# Patient Record
Sex: FEMALE | Race: WHITE | Employment: OTHER | URBAN - METROPOLITAN AREA
[De-identification: names, ages, dates, MRNs, and addresses within clinical notes are randomized per-mention and may not be internally consistent; named-entity substitution may affect disease eponyms.]

---

## 2017-02-09 DIAGNOSIS — E03.9 ACQUIRED HYPOTHYROIDISM: ICD-10-CM

## 2017-02-09 LAB — TSH SERPL DL<=0.005 MIU/L-ACNC: 2.06 MU/L (ref 0.4–4)

## 2017-02-09 PROCEDURE — 36415 COLL VENOUS BLD VENIPUNCTURE: CPT | Performed by: OBSTETRICS & GYNECOLOGY

## 2017-02-09 PROCEDURE — 84443 ASSAY THYROID STIM HORMONE: CPT | Performed by: OBSTETRICS & GYNECOLOGY

## 2017-02-09 NOTE — PROGRESS NOTES
Quick Note:    Please inform of normal results --normal TSH with adjustment in medications  ______

## 2017-02-14 ENCOUNTER — TELEPHONE (OUTPATIENT)
Dept: OBGYN | Facility: CLINIC | Age: 67
End: 2017-02-14

## 2017-02-14 NOTE — TELEPHONE ENCOUNTER
rec'd biometric screen form by mail from patient. Patient last seen in December and no labs were done at that time. Spoke with patient and I offered to place order for her to come fasting and she declines at this time. Patient requesting form to be returned to her by mail. Form mailed back.

## 2017-03-20 ENCOUNTER — TRANSFERRED RECORDS (OUTPATIENT)
Dept: HEALTH INFORMATION MANAGEMENT | Facility: CLINIC | Age: 67
End: 2017-03-20

## 2017-06-17 ENCOUNTER — HEALTH MAINTENANCE LETTER (OUTPATIENT)
Age: 67
End: 2017-06-17

## 2017-08-21 ENCOUNTER — TRANSFERRED RECORDS (OUTPATIENT)
Dept: HEALTH INFORMATION MANAGEMENT | Facility: CLINIC | Age: 67
End: 2017-08-21

## 2017-12-19 DIAGNOSIS — E03.9 ACQUIRED HYPOTHYROIDISM: ICD-10-CM

## 2017-12-21 RX ORDER — LEVOTHYROXINE SODIUM 50 UG/1
TABLET ORAL
Qty: 90 TABLET | Refills: 0 | Status: SHIPPED | OUTPATIENT
Start: 2017-12-21 | End: 2017-12-28

## 2017-12-21 NOTE — TELEPHONE ENCOUNTER
LEVOTHYROXINE 50MCG      Last Written Prescription Date:  6/22/17  Last Fill Quantity: 90,   # refills: 0  Last Office Visit: 12/20/16  Future Office visit:    Next 5 appointments (look out 90 days)     Dec 28, 2017  9:30 AM CST   PHYSICAL with Pradip Domingo MD   [facility] ([facility])    [address] [place] [id number]   [phone]                   Medication is being filled for 1 time refill only due to:  Patient needs to be seen because it has been more than one year since last visit.   Pt due for annual, appt scheduled,3 month supply sent for insurance purposes.

## 2017-12-28 ENCOUNTER — RADIANT APPOINTMENT (OUTPATIENT)
Dept: MAMMOGRAPHY | Facility: CLINIC | Age: 67
End: 2017-12-28
Payer: COMMERCIAL

## 2017-12-28 ENCOUNTER — OFFICE VISIT (OUTPATIENT)
Dept: OBGYN | Facility: CLINIC | Age: 67
End: 2017-12-28
Payer: COMMERCIAL

## 2017-12-28 VITALS
WEIGHT: 106 LBS | SYSTOLIC BLOOD PRESSURE: 142 MMHG | DIASTOLIC BLOOD PRESSURE: 82 MMHG | HEIGHT: 64 IN | BODY MASS INDEX: 18.1 KG/M2

## 2017-12-28 DIAGNOSIS — Z13.6 ENCOUNTER FOR LIPID SCREENING FOR CARDIOVASCULAR DISEASE: ICD-10-CM

## 2017-12-28 DIAGNOSIS — Z13.220 ENCOUNTER FOR LIPID SCREENING FOR CARDIOVASCULAR DISEASE: ICD-10-CM

## 2017-12-28 DIAGNOSIS — M85.80 OSTEOPENIA, SENILE: ICD-10-CM

## 2017-12-28 DIAGNOSIS — Z01.419 ENCOUNTER FOR GYNECOLOGICAL EXAMINATION WITHOUT ABNORMAL FINDING: Primary | ICD-10-CM

## 2017-12-28 DIAGNOSIS — Z12.31 VISIT FOR SCREENING MAMMOGRAM: ICD-10-CM

## 2017-12-28 DIAGNOSIS — Z13.228 SCREENING FOR METABOLIC DISORDER: ICD-10-CM

## 2017-12-28 DIAGNOSIS — Z23 NEED FOR DIPHTHERIA-TETANUS-PERTUSSIS (TDAP) VACCINE: ICD-10-CM

## 2017-12-28 DIAGNOSIS — Z23 NEED FOR PNEUMOCOCCAL VACCINE: ICD-10-CM

## 2017-12-28 DIAGNOSIS — E03.9 ACQUIRED HYPOTHYROIDISM: ICD-10-CM

## 2017-12-28 LAB
ALBUMIN SERPL-MCNC: 4 G/DL (ref 3.4–5)
ALP SERPL-CCNC: 89 U/L (ref 40–150)
ALT SERPL W P-5'-P-CCNC: 19 U/L (ref 0–50)
ANION GAP SERPL CALCULATED.3IONS-SCNC: 5 MMOL/L (ref 3–14)
AST SERPL W P-5'-P-CCNC: 26 U/L (ref 0–45)
BILIRUB SERPL-MCNC: 0.4 MG/DL (ref 0.2–1.3)
BUN SERPL-MCNC: 14 MG/DL (ref 7–30)
CALCIUM SERPL-MCNC: 8.9 MG/DL (ref 8.5–10.1)
CHLORIDE SERPL-SCNC: 104 MMOL/L (ref 94–109)
CHOLEST SERPL-MCNC: 236 MG/DL
CO2 SERPL-SCNC: 30 MMOL/L (ref 20–32)
CREAT SERPL-MCNC: 0.91 MG/DL (ref 0.52–1.04)
GFR SERPL CREATININE-BSD FRML MDRD: 62 ML/MIN/1.7M2
GLUCOSE SERPL-MCNC: 87 MG/DL (ref 70–99)
HDLC SERPL-MCNC: 98 MG/DL
LDLC SERPL CALC-MCNC: 122 MG/DL
NONHDLC SERPL-MCNC: 138 MG/DL
POTASSIUM SERPL-SCNC: 4.8 MMOL/L (ref 3.4–5.3)
PROT SERPL-MCNC: 8 G/DL (ref 6.8–8.8)
SODIUM SERPL-SCNC: 139 MMOL/L (ref 133–144)
T4 FREE SERPL-MCNC: 1.27 NG/DL (ref 0.76–1.46)
TRIGL SERPL-MCNC: 82 MG/DL
TSH SERPL DL<=0.005 MIU/L-ACNC: 3.13 MU/L (ref 0.4–4)

## 2017-12-28 PROCEDURE — 80061 LIPID PANEL: CPT | Performed by: OBSTETRICS & GYNECOLOGY

## 2017-12-28 PROCEDURE — 90472 IMMUNIZATION ADMIN EACH ADD: CPT | Performed by: OBSTETRICS & GYNECOLOGY

## 2017-12-28 PROCEDURE — G0202 SCR MAMMO BI INCL CAD: HCPCS | Mod: TC

## 2017-12-28 PROCEDURE — 84443 ASSAY THYROID STIM HORMONE: CPT | Performed by: OBSTETRICS & GYNECOLOGY

## 2017-12-28 PROCEDURE — 80053 COMPREHEN METABOLIC PANEL: CPT | Performed by: OBSTETRICS & GYNECOLOGY

## 2017-12-28 PROCEDURE — 90670 PCV13 VACCINE IM: CPT | Performed by: OBSTETRICS & GYNECOLOGY

## 2017-12-28 PROCEDURE — 84439 ASSAY OF FREE THYROXINE: CPT | Performed by: OBSTETRICS & GYNECOLOGY

## 2017-12-28 PROCEDURE — 90715 TDAP VACCINE 7 YRS/> IM: CPT | Performed by: OBSTETRICS & GYNECOLOGY

## 2017-12-28 PROCEDURE — 87624 HPV HI-RISK TYP POOLED RSLT: CPT | Performed by: OBSTETRICS & GYNECOLOGY

## 2017-12-28 PROCEDURE — 90471 IMMUNIZATION ADMIN: CPT | Performed by: OBSTETRICS & GYNECOLOGY

## 2017-12-28 PROCEDURE — 36415 COLL VENOUS BLD VENIPUNCTURE: CPT | Performed by: OBSTETRICS & GYNECOLOGY

## 2017-12-28 PROCEDURE — 99397 PER PM REEVAL EST PAT 65+ YR: CPT | Mod: 25 | Performed by: OBSTETRICS & GYNECOLOGY

## 2017-12-28 PROCEDURE — G0145 SCR C/V CYTO,THINLAYER,RESCR: HCPCS | Performed by: OBSTETRICS & GYNECOLOGY

## 2017-12-28 RX ORDER — LEVOTHYROXINE SODIUM 50 UG/1
TABLET ORAL
Qty: 90 TABLET | Refills: 1 | Status: SHIPPED | OUTPATIENT
Start: 2017-12-28 | End: 2019-01-04

## 2017-12-28 RX ORDER — LEVOTHYROXINE SODIUM 75 UG/1
75 TABLET ORAL EVERY OTHER DAY
Qty: 90 TABLET | Refills: 1 | Status: SHIPPED | OUTPATIENT
Start: 2017-12-28 | End: 2019-01-04

## 2017-12-28 ASSESSMENT — ANXIETY QUESTIONNAIRES
6. BECOMING EASILY ANNOYED OR IRRITABLE: NOT AT ALL
2. NOT BEING ABLE TO STOP OR CONTROL WORRYING: NOT AT ALL
IF YOU CHECKED OFF ANY PROBLEMS ON THIS QUESTIONNAIRE, HOW DIFFICULT HAVE THESE PROBLEMS MADE IT FOR YOU TO DO YOUR WORK, TAKE CARE OF THINGS AT HOME, OR GET ALONG WITH OTHER PEOPLE: NOT DIFFICULT AT ALL
1. FEELING NERVOUS, ANXIOUS, OR ON EDGE: NOT AT ALL
5. BEING SO RESTLESS THAT IT IS HARD TO SIT STILL: NOT AT ALL
7. FEELING AFRAID AS IF SOMETHING AWFUL MIGHT HAPPEN: NOT AT ALL
3. WORRYING TOO MUCH ABOUT DIFFERENT THINGS: NOT AT ALL
GAD7 TOTAL SCORE: 0

## 2017-12-28 ASSESSMENT — PATIENT HEALTH QUESTIONNAIRE - PHQ9
5. POOR APPETITE OR OVEREATING: NOT AT ALL
SUM OF ALL RESPONSES TO PHQ QUESTIONS 1-9: 0

## 2017-12-28 NOTE — MR AVS SNAPSHOT
"              After Visit Summary   12/28/2017    Rebecca Wang    MRN: [id number]           Patient Information     Date Of Birth          1950        Visit Information        Provider Department      12/28/2017 9:30 AM Pradip Domingo MD [facility]        Today's Diagnoses     Encounter for gynecological examination without abnormal finding    -  1    Acquired hypothyroidism        Encounter for lipid screening for cardiovascular disease        Screening for metabolic disorder        Need for pneumococcal vaccine        Need for diphtheria-tetanus-pertussis (Tdap) vaccine        Osteopenia, senile           Follow-ups after your visit        Who to contact     If you have questions or need follow up information about today's clinic visit or your schedule please contact [facility] directly at [phone].  Normal or non-critical lab and imaging results will be communicated to you by [employer], letter or phone within 4 business days after the clinic has received the results. If you do not hear from us within 7 days, please contact the clinic through [employer] or phone. If you have a critical or abnormal lab result, we will notify you by phone as soon as possible.  Submit refill requests through [employer] or call your pharmacy and they will forward the refill request to us. Please allow 3 business days for your refill to be completed.          Additional Information About Your Visit        [employer] Information     [employer] lets you send messages to your doctor, view your test results, renew your prescriptions, schedule appointments and more. To sign up, go to [email or web address] . Click on \"Log in\" on the left side of the screen, which will take you to the Welcome page. Then click on \"Sign up Now\" on the right side of the page.     You will be asked to enter the access code listed below, as well as some personal information. Please follow the directions to create " "your username and password.     Your access code is: [email or web address]  Expires: 3/28/2018 10:16 AM     Your access code will  in 90 days. If you need help or a new code, please call your [facility] clinic or [phone].        Care EveryWhere ID     This is your Care EveryWhere ID. This could be used by other organizations to access your [facility] medical records  [id number]        Your Vitals Were     Height Breastfeeding? BMI (Body Mass Index)             5' 3.5\" (1.613 m) No 18.48 kg/m2          Blood Pressure from Last 3 Encounters:   17 142/82   16 128/72   16 120/78    Weight from Last 3 Encounters:   17 106 lb (48.1 kg)   16 109 lb (49.4 kg)   16 109 lb (49.4 kg)              We Performed the Following     Comprehensive metabolic panel     EA ADD'L VACCINE     HPV High Risk Types DNA Cervical     Lipid panel reflex to direct LDL Fasting     Pap imaged thin layer screen with HPV - recommended age 30 - 65     Pneumococcal vaccine 13 valent PCV13 IM (Prevnar) [[id number]]     T4 free     TDAP VACCINE (ADACEL)     TSH     VACCINE ADMINISTRATION, INITIAL          Today's Medication Changes          These changes are accurate as of: 17 10:16 AM.  If you have any questions, ask your nurse or doctor.               These medicines have changed or have updated prescriptions.        Dose/Directions    * levothyroxine 50 MCG tablet   Commonly known as:  SYNTHROID/LEVOTHROID   This may have changed:  See the new instructions.   Used for:  Acquired hypothyroidism   Changed by:  Pradip Domingo MD        TAKE 1 TABLET BY MOUTH EVERY OTHER DAY   Quantity:  90 tablet   Refills:  1       * levothyroxine 75 MCG tablet   Commonly known as:  SYNTHROID/LEVOTHROID   This may have changed:  Another medication with the same name was changed. Make sure you understand how and when to take each.   Used for:  Acquired hypothyroidism   Changed by:  Pradip Domingo MD        Dose:  75 mcg "   Take 1 tablet (75 mcg) by mouth every other day Alternating with the 50mcg tabs.   Quantity:  90 tablet   Refills:  1       * Notice:  This list has 2 medication(s) that are the same as other medications prescribed for you. Read the directions carefully, and ask your doctor or other care provider to review them with you.         Where to get your medicines      These medications were sent to [facility] [place] - [address] AT [facility] [address]  [address], [place] [address]     Phone:  [phone]     levothyroxine 50 MCG tablet    levothyroxine 75 MCG tablet                Primary Care Provider Office Phone # Fax #    Pradip Domingo [phone] [address] [address]        Equal Access to Services     FRAN GALLAGHER AH: Hadii giacomo ivy hadasho Soomaali, waaxda luqadaha, qaybta kaalmada adeegyada, matty luz . So [facility] [phone].    ATENCIÓN: Si habla español, tiene a galvan disposición servicios gratuitos de asistencia lingüística. Horace al [phone].    We comply with applicable federal civil rights laws and [place] laws. We do not discriminate on the basis of race, color, national origin, age, disability, sex, sexual orientation, or gender identity.            Thank you!     Thank you for choosing [facility]  for your care. Our goal is always to provide you with excellent care. Hearing back from our patients is one way we can continue to improve our services. Please take a few minutes to complete the written survey that you may receive in the mail after your visit with us. Thank you!             Your Updated Medication List - Protect others around you: Learn how to safely use, store and throw away your medicines at www.disposemymeds.org.          This list is accurate as of: 12/28/17 10:16 AM.  Always use your most recent med list.                   Brand Name Dispense  Instructions for use Diagnosis    BIOTIN PO           CALCIUM PO      Take 600 mg by mouth        ibuprofen 600 MG tablet    ADVIL/MOTRIN    30 tablet    Take 1 tablet (600 mg) by mouth every 8 hours as needed for moderate pain        * levothyroxine 50 MCG tablet    SYNTHROID/LEVOTHROID    90 tablet    TAKE 1 TABLET BY MOUTH EVERY OTHER DAY    Acquired hypothyroidism       * levothyroxine 75 MCG tablet    SYNTHROID/LEVOTHROID    90 tablet    Take 1 tablet (75 mcg) by mouth every other day Alternating with the 50mcg tabs.    Acquired hypothyroidism       vitamin B complex with vitamin C Tabs tablet      Take 1 tablet by mouth daily        VITAMIN D3 PO      Take by mouth daily        * Notice:  This list has 2 medication(s) that are the same as other medications prescribed for you. Read the directions carefully, and ask your doctor or other care provider to review them with you.

## 2017-12-28 NOTE — PROGRESS NOTES
Rebecca is a 67 year old  female who presents for annual exam.     Besides routine health maintenance,  she would like to discuss multi vitamins and pneumonia vaccination.    Do you have a Health Care Directive?: Yes: Patient states has Advance Directive and will bring in a copy to clinic.    Fall risk:          HPI:  The patient does not have a PCP.    No problems.   Takes Thyroid doses every other day.  Taking vitamins and supplements.      GYNECOLOGIC HISTORY:No LMP recorded. Patient is postmenopausal..   reports that she has never smoked. She has never used smokeless tobacco.      Patient is not sexually active.  STD testing offered?  Declined  Last PHQ-9 score on record=   PHQ-9 SCORE 2017   Total Score 0     Last GAD7 score on record=   QUINTIN-7 SCORE 12/15/2015 2016 2017   Total Score 0 0 0     Alcohol Score = 1    HEALTH MAINTENANCE:  Cholesterol:   Cholesterol   Date Value Ref Range Status   12/15/2015 254 (H) <200 mg/dL Final     Comment:     Desirable:       <200 mg/dl   2013 233 (H) 125 - 200 mg/dL Final    12/15/15   Total= 254, Triglycerides=61, LJP=018, WHL=107, TSH=9.36  Last Mammo: one year ago, Result: normal, Next Mammo: today   Pap: 2012 WNL HPV (-)neg  DEXA:  16  Colonoscopy:  17, Result:  normal, Next Colonoscopy: 5 years.    Health maintenance updated:  yes    HISTORY:  Obstetric History       T3      L6     SAB1   TAB0   Ectopic1   Multiple0   Live Births3       # Outcome Date GA Lbr Sebastien/2nd Weight Sex Delivery Anes PTL Lv   5 Term         MATILDE   4 Term         MATILDE   3 Term         MATILDE   2 SAB            1 Ectopic                 Patient Active Problem List   Diagnosis     Acquired hypothyroidism     Osteopenia, senile     Past Surgical History:   Procedure Laterality Date      SECTION       CYSTOSCOPY, RETROGRADES, COMBINED Right 2016    Procedure: COMBINED CYSTOSCOPY, RETROGRADES;  Surgeon: Zac Nunez MD;   "Location: [facility] OR     CYSTOSCOPY, URETEROSCOPY, COMBINED  8/24/2016    Procedure: COMBINED CYSTOSCOPY, URETEROSCOPY;  Surgeon: Zac Nunez MD;  Location: [facility] OR     ectopic pregnancy removal       TONSILLECTOMY        Social History   Substance Use Topics     Smoking status: Never Smoker     Smokeless tobacco: Never Used     Alcohol use 0.0 oz/week     0 Standard drinks or equivalent per week      Problem (# of Occurrences) Relation (Name,Age of Onset)    Hypertension (1) Mother            Current Outpatient Prescriptions   Medication Sig     levothyroxine (SYNTHROID/LEVOTHROID) 50 MCG tablet TAKE 1 TABLET BY MOUTH EVERY OTHER DAY     levothyroxine (SYNTHROID/LEVOTHROID) 75 MCG tablet Take 1 tablet (75 mcg) by mouth every other day Alternating with the 50mcg tabs.     BIOTIN PO      ibuprofen (ADVIL,MOTRIN) 600 MG tablet Take 1 tablet (600 mg) by mouth every 8 hours as needed for moderate pain     CALCIUM PO Take 600 mg by mouth      vitamin  B complex with vitamin C (VITAMIN  B COMPLEX) TABS Take 1 tablet by mouth daily     Cholecalciferol (VITAMIN D3 PO) Take by mouth daily     [DISCONTINUED] levothyroxine (SYNTHROID/LEVOTHROID) 50 MCG tablet TAKE 1 TABLET BY MOUTH EVERY OTHER DAY     [DISCONTINUED] levothyroxine (SYNTHROID/LEVOTHROID) 50 MCG tablet TAKE 1 TABLET BY MOUTH EVERY OTHER DAY (Patient not taking: Reported on 12/28/2017)     [DISCONTINUED] levothyroxine (SYNTHROID/LEVOTHROID) 75 MCG tablet Take 1 tablet (75 mcg) by mouth every other day Alternating with the 50mcg tabs.     No current facility-administered medications for this visit.        No Known Allergies    Past medical, surgical, social and family history were reviewed and updated in EPIC.    ROS:   Respiratory: Cough    EXAM:  /82  Ht 5' 3.5\" (1.613 m)  Wt 106 lb (48.1 kg)  Breastfeeding? No  BMI 18.48 kg/m2   BMI: Body mass index is 18.48 kg/(m^2).    EXAM:  Constitutional: Appearance: Well nourished, well developed alert, in no acute " distress  Neck:  Lymph Nodes:  No lymphadenopathy present    Thyroid:  Gland size normal, nontender, no nodules or masses present  on palpation  Chest:  Respiratory Effort:  Breathing unlabored  Cardiovascular:Heart    Auscultation:  Regular rate, normal rhythm, no murmurs present  Breasts: Inspection of Breasts:  No lymphadenopathy present., Palpation of Breasts and Axillae:  No masses present on palpation, no breast tenderness., Axillary Lymph Nodes:  No lymphadenopathy present. and No nodularity, asymmetry or nipple discharge bilaterally.  Gastrointestinal:  Abdominal Examination:  Abdomen nontender to palpation, tone normal without     rigidity or guarding, no masses present, umbilicus without lesions    Liver and speen:  No hepatomegaly present, liver nontender to palpation    Hernias:  No hernias present  Lymphatic: Lymph Nodes:  No other lymphadenopathy present  Skin:  General Inspection:  No rashes present, no lesions present, no areas of  discoloration.    Genitalia and Groin:  No rashes present, no lesions present, no areas of  discoloration, no masses present  Neurologic/Psychiatric:    Mental Status:  Oriented X3     Pelvic Exam:  External Genitalia:     Normal appearance for age, no discharge present, no tenderness present, no inflammatory lesions present, color normal  Vagina:     Normal vaginal vault without central or paravaginal defects, ATROPHIC  Bladder:     Nontender to palpation  Urethra:   Urethral Body:  Urethra palpation normal, urethra structural support normal   Urethral Meatus:  No erythema or lesions present  Cervix:     Appearance healthy, no lesions present, nontender to palpation, no bleeding present  Uterus:     Nontender to palpation, no masses present, position anteflexed, mobility: normal  Adnexa:     No adnexal tenderness present, no adnexal masses present  Perineum:     Perineum within normal limits, no evidence of trauma, no rashes or skin lesions present  Inguinal Lymph Nodes:      No lymphadenopathy present        COUNSELING:   Reviewed preventive health counseling, as reflected in patient instructions       Regular exercise    BMI:  Body mass index is 18.48 kg/(m^2).  Weight management plan noted, stable and monitoring   reports that she has never smoked. She has never used smokeless tobacco.        ASSESSMENT:  67 year old female with satisfactory annual exam.    ICD-10-CM    1. Encounter for gynecological examination without abnormal finding Z01.419 Pap imaged thin layer screen with HPV - recommended age 30 - 65     HPV High Risk Types DNA Cervical   2. Acquired hypothyroidism E03.9 levothyroxine (SYNTHROID/LEVOTHROID) 50 MCG tablet     levothyroxine (SYNTHROID/LEVOTHROID) 75 MCG tablet     TSH     T4 free   3. Encounter for lipid screening for cardiovascular disease Z13.220 Lipid panel reflex to direct LDL Fasting    Z13.6    4. Screening for metabolic disorder Z13.228 Comprehensive metabolic panel   5. Need for pneumococcal vaccine Z23 Pneumococcal vaccine 13 valent PCV13 IM (Prevnar) [[id number]]     EA ADD'L VACCINE   6. Need for diphtheria-tetanus-pertussis (Tdap) vaccine Z23 TDAP VACCINE (ADACEL)     VACCINE ADMINISTRATION, INITIAL   7. Osteopenia, senile M85.80        PLAN:  DEXA next year for osteopenia.  Vaccinations today.  Shingrx next year.  Reviewed vitamins and correct dosing.    Pradip Domingo MD

## 2017-12-28 NOTE — LETTER
January 8, 2018      Rebecca JUÁREZ Felipe  [address] [facility]  [place] [address]    Dear ,      I am happy to inform you that your cervical cancer screening test (PAP smear) was normal and your Human Papillomavirus (HPV) test was negative.    Per current guidelines, you no longer need to have pap smears completed.     Please continue to be seen every year for an annual wellness visit and other preventative tests.     Please contact my office at [phone] if you have further questions.    Sincerely,      Pradip Domingo MD/rafat

## 2017-12-29 ASSESSMENT — ANXIETY QUESTIONNAIRES: GAD7 TOTAL SCORE: 0

## 2018-01-02 LAB
COPATH REPORT: NORMAL
PAP: NORMAL

## 2018-01-04 LAB
FINAL DIAGNOSIS: NORMAL
HPV HR 12 DNA CVX QL NAA+PROBE: NEGATIVE
HPV16 DNA SPEC QL NAA+PROBE: NEGATIVE
HPV18 DNA SPEC QL NAA+PROBE: NEGATIVE
SPECIMEN DESCRIPTION: NORMAL

## 2018-01-08 PROBLEM — Z12.4 CERVICAL CANCER SCREENING: Status: RESOLVED | Noted: 2018-01-08 | Resolved: 2018-01-08

## 2018-03-15 ENCOUNTER — TRANSFERRED RECORDS (OUTPATIENT)
Dept: HEALTH INFORMATION MANAGEMENT | Facility: CLINIC | Age: 68
End: 2018-03-15

## 2018-04-02 ENCOUNTER — TRANSFERRED RECORDS (OUTPATIENT)
Dept: HEALTH INFORMATION MANAGEMENT | Facility: CLINIC | Age: 68
End: 2018-04-02

## 2018-10-03 ENCOUNTER — TRANSFERRED RECORDS (OUTPATIENT)
Dept: HEALTH INFORMATION MANAGEMENT | Facility: CLINIC | Age: 68
End: 2018-10-03

## 2019-01-02 NOTE — PROGRESS NOTES
Rebecca is a 68 year old  female who presents for annual exam.     Besides routine health maintenance, she has no other health concerns today .    Do you have a Health Care Directive?: Yes: Advance Directive has been received and scanned.    Fall risk:   Fallen 2 or more times in the past year?: No  Any fall with injury in the past year?: No    HPI:  The patient's PCP is  Pradip Domingo MD.    Doing well. No issues. DEXA done today. Still has severe osteopenia of hips.     GYNECOLOGIC HISTORY:  No LMP recorded. Patient is postmenopausal..   reports that  has never smoked. she has never used smokeless tobacco.    Patient is sexually active.  STD testing offered?  Declined  Last PHQ-9 score on record=   PHQ-9 SCORE 1/3/2019   PHQ-9 Total Score 0     Last GAD7 score on record=   QUINTIN-7 SCORE 2016 2017 1/3/2019   Total Score 0 0 0     Alcohol Score = 1    HEALTH MAINTENANCE:  Cholesterol: 17   Total= 236, Triglycerides=82, HDL=98, ZJM=700, FBS=87, TSH=3.13  Cholesterol   Date Value Ref Range Status   2017 236 (H) <200 mg/dL Final     Comment:     Desirable:       <200 mg/dl   12/15/2015 254 (H) <200 mg/dL Final     Comment:     Desirable:       <200 mg/dl      Last Mammo: one year ago, Result: normal, Next Mammo: today   Pap:   Lab Results   Component Value Date    PAP NIL 2017      DEXA:  today  Colonoscopy:  17, Result:  diverticulosis, Next Colonoscopy: 4 years.    Health maintenance updated:  yes    HISTORY:  Obstetric History       T3      L6     SAB1   TAB0   Ectopic1   Multiple0   Live Births3       # Outcome Date GA Lbr Sebastien/2nd Weight Sex Delivery Anes PTL Lv   5 Term         MATILDE   4 Term         MATILDE   3 Term         MATILDE   2 SAB            1 Ectopic                 Patient Active Problem List   Diagnosis     Acquired hypothyroidism     Osteopenia, senile     Past Surgical History:   Procedure Laterality Date      SECTION       CYSTOSCOPY,  "RETROGRADES, COMBINED Right 8/24/2016    Procedure: COMBINED CYSTOSCOPY, RETROGRADES;  Surgeon: Zac Nunez MD;  Location: [facility] OR     CYSTOSCOPY, URETEROSCOPY, COMBINED  8/24/2016    Procedure: COMBINED CYSTOSCOPY, URETEROSCOPY;  Surgeon: Zac Nunez MD;  Location: [facility] OR     ectopic pregnancy removal       TONSILLECTOMY        Social History     Tobacco Use     Smoking status: Never Smoker     Smokeless tobacco: Never Used   Substance Use Topics     Alcohol use: Yes     Alcohol/week: 0.0 oz      Problem (# of Occurrences) Relation (Name,Age of Onset)    Hypertension (1) Mother            Current Outpatient Medications   Medication Sig     BIOTIN PO      CALCIUM PO Take 600 mg by mouth      Cholecalciferol (VITAMIN D3 PO) Take by mouth daily     ibuprofen (ADVIL,MOTRIN) 600 MG tablet Take 1 tablet (600 mg) by mouth every 8 hours as needed for moderate pain     levothyroxine (SYNTHROID/LEVOTHROID) 50 MCG tablet TAKE 1 TABLET BY MOUTH EVERY OTHER DAY     levothyroxine (SYNTHROID/LEVOTHROID) 75 MCG tablet Take 1 tablet (75 mcg) by mouth every other day Alternating with the 50mcg tabs.     vitamin  B complex with vitamin C (VITAMIN  B COMPLEX) TABS Take 1 tablet by mouth daily     No current facility-administered medications for this visit.        No Known Allergies    Past medical, surgical, social and family history were reviewed and updated in EPIC.    ROS:   12 point review of systems negative other than symptoms noted below.  Respiratory: Cough  Endocrine: Loss of Hair    EXAM:  /66   Pulse 76   Ht 1.613 m (5' 3.5\")   Wt 47.2 kg (104 lb)   BMI 18.13 kg/m     BMI: Body mass index is 18.13 kg/m .    EXAM:  Constitutional: Appearance: Well nourished, well developed alert, in no acute distress  Neck:  Lymph Nodes:  No lymphadenopathy present    Thyroid:  Gland size normal, nontender, no nodules or masses present  on palpation  Chest:  Respiratory Effort:  Breathing " unlabored  Cardiovascular:Heart    Auscultation:  Regular rate, normal rhythm, no murmurs present  Breasts: Inspection of Breasts:  No lymphadenopathy present., Palpation of Breasts and Axillae:  No masses present on palpation, no breast tenderness., Axillary Lymph Nodes:  No lymphadenopathy present. and No nodularity, asymmetry or nipple discharge bilaterally.  Gastrointestinal:  Abdominal Examination:  Abdomen nontender to palpation, tone normal without     rigidity or guarding, no masses present, umbilicus without lesions    Liver and speen:  No hepatomegaly present, liver nontender to palpation    Hernias:  No hernias present  Lymphatic: Lymph Nodes:  No other lymphadenopathy present  Skin:  General Inspection:  No rashes present, no lesions present, no areas of  discoloration.    Genitalia and Groin:  No rashes present, no lesions present, no areas of  discoloration, no masses present  Neurologic/Psychiatric:    Mental Status:  Oriented X3     Pelvic Exam:  External Genitalia:     Normal appearance for age, no discharge present, no tenderness present, no inflammatory lesions present, color normal  Vagina:     Normal vaginal vault without central or paravaginal defects, ATROPHIC  Bladder:     Nontender to palpation  Urethra:   Urethral Body:  Urethra palpation normal, urethra structural support normal   Urethral Meatus:  No erythema or lesions present  Cervix:     Appearance healthy, no lesions present, nontender to palpation, no bleeding present  Uterus:     Nontender to palpation, no masses present, position anteflexed, mobility: normal  Adnexa:     No adnexal tenderness present, no adnexal masses present  Perineum:     Perineum within normal limits, no evidence of trauma, no rashes or skin lesions present  Inguinal Lymph Nodes:     No lymphadenopathy present      COUNSELING:   Reviewed preventive health counseling, as reflected in patient instructions       Regular exercise       Osteoporosis Prevention/Bone  Health    BMI:  Body mass index is 18.13 kg/m .     reports that  has never smoked. she has never used smokeless tobacco.      ASSESSMENT:  68 year old female with satisfactory annual exam.    ICD-10-CM    1. Encounter for gynecological examination without abnormal finding Z01.419    2. Acquired hypothyroidism E03.9 TSH     T4 free   3. Encounter for hepatitis C screening test for low risk patient Z11.59 Hepatitis C antibody   4. Need for pneumococcal vaccine Z23 PPSV23, IM/SUBQ (2+ YRS) - [id number]     INITIAL VACCINE ADMINSTRATION   5. Osteopenia, senile M85.80        PLAN:  Discussed DEXA. Pt taking calcium and vit d. Staying active.  No FH of fracture.   FRAX calculations are still below treatment threshold. Reviewed data with patient. Explained use of neck density instead of total hip. Also discussed FRAX usage despite some decline in spine.   Will repeat in 2 years. If any further decline will need meds.    Pradip Domingo MD

## 2019-01-03 ENCOUNTER — RADIANT APPOINTMENT (OUTPATIENT)
Dept: MAMMOGRAPHY | Facility: CLINIC | Age: 69
End: 2019-01-03
Payer: COMMERCIAL

## 2019-01-03 ENCOUNTER — ANCILLARY PROCEDURE (OUTPATIENT)
Dept: BONE DENSITY | Facility: CLINIC | Age: 69
End: 2019-01-03
Payer: COMMERCIAL

## 2019-01-03 ENCOUNTER — OFFICE VISIT (OUTPATIENT)
Dept: OBGYN | Facility: CLINIC | Age: 69
End: 2019-01-03
Payer: COMMERCIAL

## 2019-01-03 VITALS
BODY MASS INDEX: 17.75 KG/M2 | HEIGHT: 64 IN | HEART RATE: 76 BPM | DIASTOLIC BLOOD PRESSURE: 66 MMHG | SYSTOLIC BLOOD PRESSURE: 122 MMHG | WEIGHT: 104 LBS

## 2019-01-03 DIAGNOSIS — M85.80 OSTEOPENIA, SENILE: ICD-10-CM

## 2019-01-03 DIAGNOSIS — Z01.419 ENCOUNTER FOR GYNECOLOGICAL EXAMINATION WITHOUT ABNORMAL FINDING: Primary | ICD-10-CM

## 2019-01-03 DIAGNOSIS — Z23 NEED FOR PNEUMOCOCCAL VACCINE: ICD-10-CM

## 2019-01-03 DIAGNOSIS — Z12.31 VISIT FOR SCREENING MAMMOGRAM: ICD-10-CM

## 2019-01-03 DIAGNOSIS — Z11.59 ENCOUNTER FOR HEPATITIS C SCREENING TEST FOR LOW RISK PATIENT: ICD-10-CM

## 2019-01-03 DIAGNOSIS — E03.9 ACQUIRED HYPOTHYROIDISM: ICD-10-CM

## 2019-01-03 DIAGNOSIS — Z78.0 ASYMPTOMATIC POSTMENOPAUSAL STATE: ICD-10-CM

## 2019-01-03 PROCEDURE — 84439 ASSAY OF FREE THYROXINE: CPT | Performed by: OBSTETRICS & GYNECOLOGY

## 2019-01-03 PROCEDURE — 99397 PER PM REEVAL EST PAT 65+ YR: CPT | Mod: 25 | Performed by: OBSTETRICS & GYNECOLOGY

## 2019-01-03 PROCEDURE — 99213 OFFICE O/P EST LOW 20 MIN: CPT | Mod: 25 | Performed by: OBSTETRICS & GYNECOLOGY

## 2019-01-03 PROCEDURE — 77067 SCR MAMMO BI INCL CAD: CPT | Mod: TC

## 2019-01-03 PROCEDURE — 86803 HEPATITIS C AB TEST: CPT | Performed by: OBSTETRICS & GYNECOLOGY

## 2019-01-03 PROCEDURE — 84443 ASSAY THYROID STIM HORMONE: CPT | Performed by: OBSTETRICS & GYNECOLOGY

## 2019-01-03 PROCEDURE — 36415 COLL VENOUS BLD VENIPUNCTURE: CPT | Performed by: OBSTETRICS & GYNECOLOGY

## 2019-01-03 PROCEDURE — 77080 DXA BONE DENSITY AXIAL: CPT | Performed by: OBSTETRICS & GYNECOLOGY

## 2019-01-03 PROCEDURE — G0009 ADMIN PNEUMOCOCCAL VACCINE: HCPCS | Performed by: OBSTETRICS & GYNECOLOGY

## 2019-01-03 PROCEDURE — 90732 PPSV23 VACC 2 YRS+ SUBQ/IM: CPT | Performed by: OBSTETRICS & GYNECOLOGY

## 2019-01-03 ASSESSMENT — ANXIETY QUESTIONNAIRES
1. FEELING NERVOUS, ANXIOUS, OR ON EDGE: NOT AT ALL
2. NOT BEING ABLE TO STOP OR CONTROL WORRYING: NOT AT ALL
5. BEING SO RESTLESS THAT IT IS HARD TO SIT STILL: NOT AT ALL
7. FEELING AFRAID AS IF SOMETHING AWFUL MIGHT HAPPEN: NOT AT ALL
6. BECOMING EASILY ANNOYED OR IRRITABLE: NOT AT ALL
3. WORRYING TOO MUCH ABOUT DIFFERENT THINGS: NOT AT ALL
GAD7 TOTAL SCORE: 0

## 2019-01-03 ASSESSMENT — PATIENT HEALTH QUESTIONNAIRE - PHQ9
5. POOR APPETITE OR OVEREATING: NOT AT ALL
SUM OF ALL RESPONSES TO PHQ QUESTIONS 1-9: 0

## 2019-01-03 ASSESSMENT — MIFFLIN-ST. JEOR: SCORE: 988.8

## 2019-01-04 ENCOUNTER — TELEPHONE (OUTPATIENT)
Dept: OBGYN | Facility: CLINIC | Age: 69
End: 2019-01-04

## 2019-01-04 LAB
HCV AB SERPL QL IA: NONREACTIVE
T4 FREE SERPL-MCNC: 1.14 NG/DL (ref 0.76–1.46)
TSH SERPL DL<=0.005 MIU/L-ACNC: 6.21 MU/L (ref 0.4–4)

## 2019-01-04 RX ORDER — LEVOTHYROXINE SODIUM 50 UG/1
50 TABLET ORAL DAILY
Qty: 40 TABLET | Refills: 0 | Status: SHIPPED | OUTPATIENT
Start: 2019-01-04 | End: 2019-01-04

## 2019-01-04 RX ORDER — LEVOTHYROXINE SODIUM 75 UG/1
75 TABLET ORAL EVERY OTHER DAY
Qty: 40 TABLET | Refills: 0 | Status: SHIPPED | OUTPATIENT
Start: 2019-01-04 | End: 2019-01-07

## 2019-01-04 ASSESSMENT — ANXIETY QUESTIONNAIRES: GAD7 TOTAL SCORE: 0

## 2019-01-04 NOTE — RESULT ENCOUNTER NOTE
TSH high, meaning needs more hormone. Try taking the 75mcg pill daily for 6 weeks and recheck. No alternating pills. Send refill for 6weeks of the 75mcg

## 2019-01-07 ENCOUNTER — TELEPHONE (OUTPATIENT)
Dept: OBGYN | Facility: CLINIC | Age: 69
End: 2019-01-07

## 2019-01-07 DIAGNOSIS — E03.9 ACQUIRED HYPOTHYROIDISM: ICD-10-CM

## 2019-01-07 RX ORDER — LEVOTHYROXINE SODIUM 75 UG/1
75 TABLET ORAL DAILY
Qty: 40 TABLET | Refills: 0 | Status: SHIPPED | OUTPATIENT
Start: 2019-01-07 | End: 2019-03-26

## 2019-01-07 NOTE — TELEPHONE ENCOUNTER
"Pt calling to clarify synthroid dosing. Per OV notes \"TSH high, meaning needs more hormone. Try taking the 75mcg pill daily for 6 weeks and recheck. No alternating pills. Send refill for 6weeks of the 75mcg.\" New rx sent to pharmacy, lab orders placed for future. No further questions/concerns.  Eulalia Martinez RN on 1/7/2019 at 9:42 AM    "

## 2019-02-18 DIAGNOSIS — E03.9 ACQUIRED HYPOTHYROIDISM: ICD-10-CM

## 2019-02-18 PROCEDURE — 84439 ASSAY OF FREE THYROXINE: CPT | Performed by: OBSTETRICS & GYNECOLOGY

## 2019-02-18 PROCEDURE — 84443 ASSAY THYROID STIM HORMONE: CPT | Performed by: OBSTETRICS & GYNECOLOGY

## 2019-02-18 PROCEDURE — 36415 COLL VENOUS BLD VENIPUNCTURE: CPT | Performed by: OBSTETRICS & GYNECOLOGY

## 2019-02-19 LAB
T4 FREE SERPL-MCNC: 1.15 NG/DL (ref 0.76–1.46)
TSH SERPL DL<=0.005 MIU/L-ACNC: 3.67 MU/L (ref 0.4–4)

## 2019-02-19 NOTE — RESULT ENCOUNTER NOTE
Normal levels. Continue 75mcg daily and recheck in 3 months to be sure the dose is good. TSH just in normal range. Make sure there are no manufacture changes when getting refills.

## 2019-02-20 ENCOUNTER — TELEPHONE (OUTPATIENT)
Dept: OBGYN | Facility: CLINIC | Age: 69
End: 2019-02-20

## 2019-02-20 DIAGNOSIS — E03.9 ACQUIRED HYPOTHYROIDISM: Primary | ICD-10-CM

## 2019-02-20 NOTE — TELEPHONE ENCOUNTER
Patient called to schedule lab appt. For 5/20, f/u to thyroid labs.  Need orders placed.        Orders placed  Eulalia aMrtinez RN on 2/20/2019 at 12:37 PM

## 2019-03-26 DIAGNOSIS — E03.9 ACQUIRED HYPOTHYROIDISM: ICD-10-CM

## 2019-03-26 RX ORDER — LEVOTHYROXINE SODIUM 75 UG/1
TABLET ORAL
Qty: 30 TABLET | Refills: 0 | Status: SHIPPED | OUTPATIENT
Start: 2019-03-26 | End: 2019-04-29

## 2019-03-26 NOTE — TELEPHONE ENCOUNTER
"Requested Prescriptions   Pending Prescriptions Disp Refills     levothyroxine (SYNTHROID/LEVOTHROID) 75 MCG tablet [Pharmacy Med Name: LEVOTHYROXINE 0.075MG (75MCG) TABS] 40 tablet 0     Sig: TAKE 1 TABLET(75 MCG) BY MOUTH DAILY    Thyroid Protocol Passed - 3/26/2019 10:27 AM       Passed - Patient is 12 years or older       Passed - Recent (12 mo) or future (30 days) visit within the authorizing provider's specialty    Patient had office visit in the last 12 months or has a visit in the next 30 days with authorizing provider or within the authorizing provider's specialty.  See \"Patient Info\" tab in inbasket, or \"Choose Columns\" in Meds & Orders section of the refill encounter.             Passed - Medication is active on med list       Passed - Normal TSH on file in past 12 months    Recent Labs   Lab Test 02/18/19  1044   TSH 3.67             Passed - No active pregnancy on record    If patient is pregnant or has had a positive pregnancy test, please check TSH.         Passed - No positive pregnancy test in past 12 months    If patient is pregnant or has had a positive pregnancy test, please check TSH.          Medication is being filled for 1 time refill only due to:  Patient needs labs TSH/T4 .   Eulalia Martinez RN on 3/26/2019 at 10:35 AM      "

## 2019-04-08 ENCOUNTER — TRANSFERRED RECORDS (OUTPATIENT)
Dept: HEALTH INFORMATION MANAGEMENT | Facility: CLINIC | Age: 69
End: 2019-04-08

## 2019-04-29 DIAGNOSIS — E03.9 ACQUIRED HYPOTHYROIDISM: ICD-10-CM

## 2019-04-29 RX ORDER — LEVOTHYROXINE SODIUM 75 UG/1
TABLET ORAL
Qty: 30 TABLET | Refills: 0 | Status: SHIPPED | OUTPATIENT
Start: 2019-04-29 | End: 2019-05-22

## 2019-04-29 RX ORDER — LEVOTHYROXINE SODIUM 75 UG/1
TABLET ORAL
Qty: 30 TABLET | Refills: 0 | OUTPATIENT
Start: 2019-04-29

## 2019-04-29 NOTE — TELEPHONE ENCOUNTER
"Requested Prescriptions   Pending Prescriptions Disp Refills     levothyroxine (SYNTHROID/LEVOTHROID) 75 MCG tablet [Pharmacy Med Name: LEVOTHYROXINE 0.075MG (75MCG) TABS] 30 tablet 0     Sig: TAKE 1 TABLET BY MOUTH(75MCG) DAILY       Thyroid Protocol Passed - 4/29/2019  9:08 AM        Passed - Patient is 12 years or older        Passed - Recent (12 mo) or future (30 days) visit within the authorizing provider's specialty     Patient had office visit in the last 12 months or has a visit in the next 30 days with authorizing provider or within the authorizing provider's specialty.  See \"Patient Info\" tab in inbasket, or \"Choose Columns\" in Meds & Orders section of the refill encounter.              Passed - Medication is active on med list        Passed - Normal TSH on file in past 12 months     Recent Labs   Lab Test 02/18/19  1044   TSH 3.67              Passed - No active pregnancy on record     If patient is pregnant or has had a positive pregnancy test, please check TSH.          Passed - No positive pregnancy test in past 12 months     If patient is pregnant or has had a positive pregnancy test, please check TSH.          Lab appt scheduled. Refill sent  Eulalia Martinez RN on 4/29/2019 at 9:37 AM    "

## 2019-04-29 NOTE — TELEPHONE ENCOUNTER
"Requested Prescriptions   Pending Prescriptions Disp Refills     levothyroxine (SYNTHROID/LEVOTHROID) 75 MCG tablet [Pharmacy Med Name: LEVOTHYROXINE 0.075MG (75MCG) TABS] 30 tablet 0     Sig: TAKE 1 TABLET BY MOUTH(75MCG) DAILY       Thyroid Protocol Passed - 4/29/2019  9:50 AM        Passed - Patient is 12 years or older        Passed - Recent (12 mo) or future (30 days) visit within the authorizing provider's specialty     Patient had office visit in the last 12 months or has a visit in the next 30 days with authorizing provider or within the authorizing provider's specialty.  See \"Patient Info\" tab in inbasket, or \"Choose Columns\" in Meds & Orders section of the refill encounter.              Passed - Medication is active on med list        Passed - Normal TSH on file in past 12 months     Recent Labs   Lab Test 02/18/19  1044   TSH 3.67              Passed - No active pregnancy on record     If patient is pregnant or has had a positive pregnancy test, please check TSH.          Passed - No positive pregnancy test in past 12 months     If patient is pregnant or has had a positive pregnancy test, please check TSH.          Refill already sent  Eulalia Martinez RN on 4/29/2019 at 9:55 AM    "

## 2019-05-20 DIAGNOSIS — E03.9 ACQUIRED HYPOTHYROIDISM: ICD-10-CM

## 2019-05-20 PROCEDURE — 84439 ASSAY OF FREE THYROXINE: CPT | Performed by: OBSTETRICS & GYNECOLOGY

## 2019-05-20 PROCEDURE — 84443 ASSAY THYROID STIM HORMONE: CPT | Performed by: OBSTETRICS & GYNECOLOGY

## 2019-05-20 PROCEDURE — 36415 COLL VENOUS BLD VENIPUNCTURE: CPT | Performed by: OBSTETRICS & GYNECOLOGY

## 2019-05-21 LAB
T4 FREE SERPL-MCNC: 1.17 NG/DL (ref 0.76–1.46)
TSH SERPL DL<=0.005 MIU/L-ACNC: 2.81 MU/L (ref 0.4–4)

## 2019-05-22 DIAGNOSIS — E03.9 ACQUIRED HYPOTHYROIDISM: ICD-10-CM

## 2019-05-22 RX ORDER — LEVOTHYROXINE SODIUM 75 UG/1
TABLET ORAL
Qty: 90 TABLET | Refills: 2 | Status: SHIPPED | OUTPATIENT
Start: 2019-05-22 | End: 2020-01-07

## 2020-01-06 NOTE — PROGRESS NOTES
Rebecca is a 69 year old  female who presents for annual exam.     Besides routine health maintenance, she has no other health concerns today .    Do you have a Health Care Directive?: Yes, patient states has an Advance Care Planning document and will bring a copy to the clinic.    Fall risk:   Fall Risk Assessment not completed.    HPI:  The patient's PCP is  Pradip Domingo MD.    Doing well. On 75ugm of Levothyroxine a day. This is different from prior alternate dosing.   Has multiple supplements that she wants to review.    GYNECOLOGIC HISTORY:  No LMP recorded. Patient is postmenopausal..   reports that she has never smoked. She has never used smokeless tobacco.    Patient is sexually active.  STD testing offered?  Declined  Last PHQ-9 score on record=   PHQ-9 SCORE 2020   PHQ-9 Total Score 0     Last GAD7 score on record=   QUINTIN-7 SCORE 2017 1/3/2019 2020   Total Score 0 0 0     Alcohol Score = 0    HEALTH MAINTENANCE:  Cholesterol:   Cholesterol   Date Value Ref Range Status   2017 236 (H) <200 mg/dL Final     Comment:     Desirable:       <200 mg/dl   12/15/2015 254 (H) <200 mg/dL Final     Comment:     Desirable:       <200 mg/dl      Last Mammo: One year ago, Result: Normal, Next Mammo: Today 2020  Pap:   Lab Results   Component Value Date    PAP NIL, HPV - 2017     DEXA: 1/3/2019  Colonoscopy:  2017, Result:  Normal, Next Colonoscopy: 5 years.    Health maintenance updated:  no    HISTORY:  OB History    Para Term  AB Living   5 3 3 0 2 6   SAB TAB Ectopic Multiple Live Births   1 0 1 0 3      # Outcome Date GA Lbr Sebastien/2nd Weight Sex Delivery Anes PTL Lv   5 Term         MATILDE   4 Term         MATILDE   3 Term         MATILDE   2 SAB            1 Ectopic              Patient Active Problem List   Diagnosis     Acquired hypothyroidism     Osteopenia, senile     Past Surgical History:   Procedure Laterality Date      SECTION       CYSTOSCOPY, RETROGRADES,  "COMBINED Right 8/24/2016    Procedure: COMBINED CYSTOSCOPY, RETROGRADES;  Surgeon: Zac Nunez MD;  Location: [facility] OR     CYSTOSCOPY, URETEROSCOPY, COMBINED  8/24/2016    Procedure: COMBINED CYSTOSCOPY, URETEROSCOPY;  Surgeon: Zac Nunez MD;  Location: [facility] OR     ectopic pregnancy removal       TONSILLECTOMY        Social History     Tobacco Use     Smoking status: Never Smoker     Smokeless tobacco: Never Used   Substance Use Topics     Alcohol use: Yes     Alcohol/week: 0.0 standard drinks      Problem (# of Occurrences) Relation (Name,Age of Onset)    Hypertension (1) Mother    No Known Problems (7) Father, Sister, Maternal Grandmother, Maternal Grandfather, Paternal Grandmother, Other, Brother    Pneumonia (1) Brother            Current Outpatient Medications   Medication Sig     CALCIUM PO Take 600 mg by mouth      Cholecalciferol (VITAMIN D3 PO) Take by mouth daily     ibuprofen (ADVIL,MOTRIN) 600 MG tablet Take 1 tablet (600 mg) by mouth every 8 hours as needed for moderate pain     levothyroxine (SYNTHROID/LEVOTHROID) 75 MCG tablet TAKE 1 TABLET BY MOUTH(75MCG) DAILY     multivitamin w/minerals (MULTI-VITAMIN) tablet Take 1 tablet by mouth daily     vitamin  B complex with vitamin C (VITAMIN  B COMPLEX) TABS Take 1 tablet by mouth daily     No current facility-administered medications for this visit.        No Known Allergies    Past medical, surgical, social and family history were reviewed and updated in EPIC.    ROS:   12 point review of systems negative other than symptoms noted below or in the HPI.      EXAM:  /72   Pulse 74   Ht 1.613 m (5' 3.5\")   Wt 47.8 kg (105 lb 6.4 oz)   BMI 18.38 kg/m     BMI: Body mass index is 18.38 kg/m .    EXAM:  Constitutional: Appearance: Well nourished, well developed alert, in no acute distress  Neck:  Lymph Nodes:  No lymphadenopathy present    Thyroid:  Gland size normal, nontender, no nodules or masses present  on palpation  Chest:  Respiratory " Effort:  Breathing unlabored  Cardiovascular:Heart    Auscultation:  Regular rate, normal rhythm, no murmurs present  Breasts: Inspection of Breasts:  No lymphadenopathy present., Palpation of Breasts and Axillae:  No masses present on palpation, no breast tenderness., Axillary Lymph Nodes:  No lymphadenopathy present. and No nodularity, asymmetry or nipple discharge bilaterally.  Gastrointestinal:  Abdominal Examination:  Abdomen nontender to palpation, tone normal without     rigidity or guarding, no masses present, umbilicus without lesions    Liver and speen:  No hepatomegaly present, liver nontender to palpation    Hernias:  No hernias present  Lymphatic: Lymph Nodes:  No other lymphadenopathy present  Skin:  General Inspection:  No rashes present, no lesions present, no areas of  discoloration.    Genitalia and Groin:  No rashes present, no lesions present, no areas of  discoloration, no masses present  Neurologic/Psychiatric:    Mental Status:  Oriented X3     Pelvic Exam:  External Genitalia:     Normal appearance for age, no discharge present, no tenderness present, no inflammatory lesions present, color normal  Vagina:     Normal vaginal vault without central or paravaginal defects, ATROPHIC  Bladder:     Nontender to palpation  Urethra:   Urethral Body:  Urethra palpation normal, urethra structural support normal   Urethral Meatus:  No erythema or lesions present  Cervix:     Appearance healthy, no lesions present, nontender to palpation, no bleeding present  Uterus:     Nontender to palpation, no masses present, position anteflexed, mobility: normal  Adnexa:     No adnexal tenderness present, no adnexal masses present  Perineum:     Perineum within normal limits, no evidence of trauma, no rashes or skin lesions present  Inguinal Lymph Nodes:     No lymphadenopathy present      COUNSELING:   Reviewed preventive health counseling, as reflected in patient instructions       Regular exercise    BMI:  Body  mass index is 18.38 kg/m .     reports that she has never smoked. She has never used smokeless tobacco.      ASSESSMENT:  69 year old female with satisfactory annual exam.    ICD-10-CM    1. Encounter for gynecological examination without abnormal finding Z01.419    2. Acquired hypothyroidism E03.9 levothyroxine (SYNTHROID/LEVOTHROID) 75 MCG tablet     TSH     T4 free   3. Hypothyroidism E03.9        PLAN:  Reviewed supplements. Will back down on all the vit d from multiple sources.   Check thyroid labs.  Pt interested in [facility]. Will check insurance.     Pradip Domingo MD

## 2020-01-07 ENCOUNTER — OFFICE VISIT (OUTPATIENT)
Dept: OBGYN | Facility: CLINIC | Age: 70
End: 2020-01-07
Payer: COMMERCIAL

## 2020-01-07 ENCOUNTER — ANCILLARY PROCEDURE (OUTPATIENT)
Dept: MAMMOGRAPHY | Facility: CLINIC | Age: 70
End: 2020-01-07
Payer: COMMERCIAL

## 2020-01-07 VITALS
HEIGHT: 64 IN | DIASTOLIC BLOOD PRESSURE: 72 MMHG | SYSTOLIC BLOOD PRESSURE: 120 MMHG | WEIGHT: 105.4 LBS | BODY MASS INDEX: 17.99 KG/M2 | HEART RATE: 74 BPM

## 2020-01-07 DIAGNOSIS — Z01.419 ENCOUNTER FOR GYNECOLOGICAL EXAMINATION WITHOUT ABNORMAL FINDING: Primary | ICD-10-CM

## 2020-01-07 DIAGNOSIS — Z12.31 VISIT FOR SCREENING MAMMOGRAM: ICD-10-CM

## 2020-01-07 DIAGNOSIS — E03.9 ACQUIRED HYPOTHYROIDISM: ICD-10-CM

## 2020-01-07 PROCEDURE — 77067 SCR MAMMO BI INCL CAD: CPT | Mod: TC

## 2020-01-07 PROCEDURE — 84439 ASSAY OF FREE THYROXINE: CPT | Performed by: OBSTETRICS & GYNECOLOGY

## 2020-01-07 PROCEDURE — 36415 COLL VENOUS BLD VENIPUNCTURE: CPT | Performed by: OBSTETRICS & GYNECOLOGY

## 2020-01-07 PROCEDURE — 99397 PER PM REEVAL EST PAT 65+ YR: CPT | Performed by: OBSTETRICS & GYNECOLOGY

## 2020-01-07 PROCEDURE — 84443 ASSAY THYROID STIM HORMONE: CPT | Performed by: OBSTETRICS & GYNECOLOGY

## 2020-01-07 RX ORDER — MULTIPLE VITAMINS W/ MINERALS TAB 9MG-400MCG
1 TAB ORAL DAILY
COMMUNITY

## 2020-01-07 RX ORDER — LEVOTHYROXINE SODIUM 75 UG/1
TABLET ORAL
Qty: 90 TABLET | Refills: 3 | Status: SHIPPED | OUTPATIENT
Start: 2020-01-07

## 2020-01-07 ASSESSMENT — ANXIETY QUESTIONNAIRES
6. BECOMING EASILY ANNOYED OR IRRITABLE: NOT AT ALL
5. BEING SO RESTLESS THAT IT IS HARD TO SIT STILL: NOT AT ALL
7. FEELING AFRAID AS IF SOMETHING AWFUL MIGHT HAPPEN: NOT AT ALL
2. NOT BEING ABLE TO STOP OR CONTROL WORRYING: NOT AT ALL
3. WORRYING TOO MUCH ABOUT DIFFERENT THINGS: NOT AT ALL
GAD7 TOTAL SCORE: 0
1. FEELING NERVOUS, ANXIOUS, OR ON EDGE: NOT AT ALL
IF YOU CHECKED OFF ANY PROBLEMS ON THIS QUESTIONNAIRE, HOW DIFFICULT HAVE THESE PROBLEMS MADE IT FOR YOU TO DO YOUR WORK, TAKE CARE OF THINGS AT HOME, OR GET ALONG WITH OTHER PEOPLE: NOT DIFFICULT AT ALL

## 2020-01-07 ASSESSMENT — PATIENT HEALTH QUESTIONNAIRE - PHQ9
SUM OF ALL RESPONSES TO PHQ QUESTIONS 1-9: 0
5. POOR APPETITE OR OVEREATING: NOT AT ALL

## 2020-01-07 ASSESSMENT — MIFFLIN-ST. JEOR: SCORE: 990.15

## 2020-01-08 LAB
T4 FREE SERPL-MCNC: 1.35 NG/DL (ref 0.76–1.46)
TSH SERPL DL<=0.005 MIU/L-ACNC: 0.9 MU/L (ref 0.4–4)

## 2020-01-08 ASSESSMENT — ANXIETY QUESTIONNAIRES: GAD7 TOTAL SCORE: 0

## 2020-07-13 ENCOUNTER — TRANSFERRED RECORDS (OUTPATIENT)
Dept: HEALTH INFORMATION MANAGEMENT | Facility: CLINIC | Age: 70
End: 2020-07-13

## 2020-11-17 ENCOUNTER — TELEPHONE (OUTPATIENT)
Dept: OBGYN | Facility: CLINIC | Age: 70
End: 2020-11-17

## 2020-11-17 NOTE — TELEPHONE ENCOUNTER
Pt feels she has thrush sx's   Did home remedies of baking soda swish  Recommended an evaluation with PCP or urgent care.    With dr Domingo retiring, recommended [facility] as options for internal medicine going forward or Dr NISHANT De Souzas here for GYN.    Pt verbalized understanding, in agreement with plan, and voiced no further questions.    Arabella Jones RN on 11/17/2020 at 12:17 PM

## 2021-01-13 ENCOUNTER — TRANSFERRED RECORDS (OUTPATIENT)
Dept: HEALTH INFORMATION MANAGEMENT | Facility: CLINIC | Age: 71
End: 2021-01-13

## 2021-07-22 ENCOUNTER — TRANSFERRED RECORDS (OUTPATIENT)
Dept: HEALTH INFORMATION MANAGEMENT | Facility: CLINIC | Age: 71
End: 2021-07-22

## 2022-01-24 ENCOUNTER — TRANSFERRED RECORDS (OUTPATIENT)
Dept: HEALTH INFORMATION MANAGEMENT | Facility: CLINIC | Age: 72
End: 2022-01-24
Payer: COMMERCIAL

## 2022-04-04 ENCOUNTER — ANCILLARY PROCEDURE (OUTPATIENT)
Dept: BONE DENSITY | Facility: CLINIC | Age: 72
End: 2022-04-04
Attending: FAMILY MEDICINE
Payer: COMMERCIAL

## 2022-04-04 DIAGNOSIS — Z78.0 ASYMPTOMATIC MENOPAUSAL STATE: ICD-10-CM

## 2022-04-04 LAB — RADIOLOGIST FLAGS: NORMAL

## 2022-04-04 PROCEDURE — 77080 DXA BONE DENSITY AXIAL: CPT | Performed by: RADIOLOGY

## 2022-07-26 ENCOUNTER — TRANSFERRED RECORDS (OUTPATIENT)
Dept: HEALTH INFORMATION MANAGEMENT | Facility: CLINIC | Age: 72
End: 2022-07-26

## 2022-09-23 ENCOUNTER — TRANSFERRED RECORDS (OUTPATIENT)
Dept: OBGYN | Facility: CLINIC | Age: 72
End: 2022-09-23